# Patient Record
Sex: FEMALE | Race: AMERICAN INDIAN OR ALASKA NATIVE
[De-identification: names, ages, dates, MRNs, and addresses within clinical notes are randomized per-mention and may not be internally consistent; named-entity substitution may affect disease eponyms.]

---

## 2018-03-24 ENCOUNTER — HOSPITAL ENCOUNTER (EMERGENCY)
Dept: HOSPITAL 5 - ED | Age: 23
Discharge: HOME | End: 2018-03-24
Payer: COMMERCIAL

## 2018-03-24 VITALS — DIASTOLIC BLOOD PRESSURE: 72 MMHG | SYSTOLIC BLOOD PRESSURE: 131 MMHG

## 2018-03-24 DIAGNOSIS — J39.2: Primary | ICD-10-CM

## 2018-03-24 PROCEDURE — 99283 EMERGENCY DEPT VISIT LOW MDM: CPT

## 2018-03-24 PROCEDURE — 70360 X-RAY EXAM OF NECK: CPT

## 2018-03-24 NOTE — XRAY REPORT
FINAL REPORT



PROCEDURE:  XR NECK SOFT TISSUE



TECHNIQUE:  Soft tissue neck radiographs, 2 views, including AP

and lateral. CPT 26823







HISTORY:  FB in throat 



COMPARISON:  No prior studies are available for comparison.



FINDINGS:  

Bone mineralization: Normal.



Alignment: Normal.



Soft tissues: Epiglottis and hypopharyngeal soft tissues normal.



Foreign bodies: None.



IMPRESSION:  

Normal Examination.

## 2018-03-24 NOTE — EMERGENCY DEPARTMENT REPORT
HPI





- General


Chief Complaint: Sore Throat


Time Seen by Provider: 03/24/18 12:56





- HPI


HPI: 





Patient is a 23-year-old female who states she feels like there is constant for 

the past 3 months.  Patient states she was chewing when he presented on 3 

months ago when she coughed and he went behind her throat.  Patient states she 

is dementia so some discomfort.  Patient states she is able to eat and drink 

fluids okay with no problems.  Patient states that she has intermittent 

coughing.  She denies fevers/chills/nausea/vomiting/abdominal pain/difficulty 

breathing or swallowing





ED Past Medical Hx





- Past Medical History


Previous Medical History?: No





- Surgical History


Past Surgical History?: No





- Social History


Smoking Status: Never Smoker





ED Review of Systems


ROS: 


Stated complaint: GUM STUCK IN THROAT


Other details as noted in HPI





Constitutional: denies: chills, fever


Eyes: denies: eye pain, eye discharge, vision change


ENT: denies: ear pain, throat pain


Respiratory: denies: cough, shortness of breath, wheezing


Cardiovascular: denies: chest pain, palpitations


Endocrine: no symptoms reported


Gastrointestinal: denies: abdominal pain, nausea, diarrhea


Genitourinary: denies: urgency, dysuria, discharge


Musculoskeletal: denies: back pain, joint swelling, arthralgia


Skin: denies: rash, lesions


Neurological: denies: headache, weakness, paresthesias


Psychiatric: denies: anxiety, depression


Hematological/Lymphatic: denies: easy bleeding, easy bruising





Physical Exam





- Physical Exam


Vital Signs: 


 Vital Signs











  03/24/18





  12:09


 


Temperature 98.4 F


 


Pulse Rate 94 H


 


Respiratory 18





Rate 


 


Blood Pressure 133/79


 


O2 Sat by Pulse 98





Oximetry 











Physical Exam: 





GENERAL: Alert and oriented x3, no apparent distress, Normal Gait, atraumatic.


HEAD: Head is normocephalic and a-traumatic.





MOUTH:Mouth is well hydrated and without lesions. Tonsils nonerythematous or 

swollen,  Uvula midline, Tongue not elevated. Mucous membranes are moist. 

Posterior pharynx clear, no exudate or lesions. Patent airways.  No foreign 

body or objects visualized.


NECK: Supple. Non edematous,   No lymphadenopathy or thyromegaly.  No C-spine 

tenderness


LUNGS: Symetrical with respiration, No wheezing, no rales or crackles, CTAB.


HEART:  S1, S2 present, regular rate and rhythm without murmur, no rubs, no 

gallops. Non tender to palpation








SKIN:  Warm and dry, No lesions, No ulceration or induration present.











ED Course


 Vital Signs











  03/24/18





  12:09


 


Temperature 98.4 F


 


Pulse Rate 94 H


 


Respiratory 18





Rate 


 


Blood Pressure 133/79


 


O2 Sat by Pulse 98





Oximetry 














ED Medical Decision Making





- Radiology Data


Radiology results: report reviewed, image reviewed


FINAL REPORT 





PROCEDURE: XR NECK SOFT TISSUE 





TECHNIQUE: Soft tissue neck radiographs, 2 views, including AP 


and lateral. CPT 68495 











HISTORY: FB in throat 





COMPARISON: No prior studies are available for comparison. 





FINDINGS: 


Bone mineralization: Normal. 





Alignment: Normal. 





Soft tissues: Epiglottis and hypopharyngeal soft tissues normal. 





Foreign bodies: None. 





IMPRESSION: 


Normal Examination. 











Transcribed By: [facility] 


Dictated By: APRYL FULLER MD 


Electronically Authenticated By: APRYL FULLER MD 


Signed Date/Time: 03/24/18 1612 








- Medical Decision Making


23-year-old female presents thought of foreign body in throat, throat dryness


I discussed with the patient and most times comes usually go down the throat 

and may have, through the


ED course: Soft tissue x-ray shows no foreign body in throat.


Discussed this findings with the patient.  Patient is in no acute or 

respiratory distress


Vital signs are stable





Critical care attestation.: 


If time is entered above; I have spent that time in minutes in the direct care 

of this critically ill patient, excluding procedure time.








ED Disposition


Clinical Impression: 


 Throat discomfort, Throat dryness





Disposition: DC-01 TO HOME OR SELFCARE


Is pt being admited?: No


Does the pt Need Aspirin: No


Condition: Stable


Instructions:  Pharyngitis (ED), Foreign Body Ingestion (ED)


Additional Instructions: 


Make sure to follow up with the primary care physician as discussed.


U cane use over-the-counter wish such as listerine, crest mouth wash to gargle


If you have any worsening symptoms or develop new symptoms please return to ED 

immediately.


Referrals: 


PRIMARY CARE,MD [Primary Care Provider] - 3-5 Days


ALF LISA MD [Referring] - 3-5 Days


[facility] [Outside] - 3-5 Days


[facility] [Outside] - 3-5 Days


Forms:  Accompanied Note, Work/School Release Form(ED)


Time of Disposition: 16:31